# Patient Record
Sex: FEMALE | Race: WHITE | NOT HISPANIC OR LATINO
[De-identification: names, ages, dates, MRNs, and addresses within clinical notes are randomized per-mention and may not be internally consistent; named-entity substitution may affect disease eponyms.]

---

## 2019-05-24 ENCOUNTER — APPOINTMENT (OUTPATIENT)
Dept: POPULATION HEALTH | Facility: CLINIC | Age: 44
End: 2019-05-24
Payer: COMMERCIAL

## 2019-05-24 VITALS
OXYGEN SATURATION: 99 % | HEIGHT: 62 IN | SYSTOLIC BLOOD PRESSURE: 110 MMHG | BODY MASS INDEX: 25.76 KG/M2 | WEIGHT: 140 LBS | DIASTOLIC BLOOD PRESSURE: 70 MMHG | HEART RATE: 96 BPM | RESPIRATION RATE: 16 BRPM

## 2019-05-24 DIAGNOSIS — Z87.891 PERSONAL HISTORY OF NICOTINE DEPENDENCE: ICD-10-CM

## 2019-05-24 DIAGNOSIS — L23.81: ICD-10-CM

## 2019-05-24 DIAGNOSIS — J45.909 UNSPECIFIED ASTHMA, UNCOMPLICATED: ICD-10-CM

## 2019-05-24 DIAGNOSIS — Z82.5 FAMILY HISTORY OF ASTHMA AND OTHER CHRONIC LOWER RESPIRATORY DISEASES: ICD-10-CM

## 2019-05-24 PROBLEM — Z00.00 ENCOUNTER FOR PREVENTIVE HEALTH EXAMINATION: Status: ACTIVE | Noted: 2019-05-24

## 2019-05-24 PROCEDURE — 99203 OFFICE O/P NEW LOW 30 MIN: CPT

## 2019-05-24 RX ORDER — LORATADINE 5 MG/5 ML
10 SOLUTION, ORAL ORAL DAILY
Qty: 30 | Refills: 0 | Status: ACTIVE | COMMUNITY
Start: 2019-05-24 | End: 1900-01-01

## 2019-05-24 RX ORDER — ALBUTEROL 90 MCG
90 AEROSOL (GRAM) INHALATION
Refills: 0 | Status: ACTIVE | COMMUNITY

## 2019-05-24 RX ORDER — FLUTICASONE PROPIONATE 110 UG/1
110 AEROSOL, METERED RESPIRATORY (INHALATION) TWICE DAILY
Qty: 1 | Refills: 0 | Status: ACTIVE | COMMUNITY
Start: 2019-05-24 | End: 1900-01-01

## 2019-05-24 RX ORDER — FLUTICASONE PROPIONATE 50 UG/1
50 SPRAY, METERED NASAL TWICE DAILY
Qty: 1 | Refills: 0 | Status: ACTIVE | COMMUNITY
Start: 2019-05-24 | End: 1900-01-01

## 2019-05-24 NOTE — HISTORY OF PRESENT ILLNESS
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 Symptoms improve by the end of the week, and are better at home over the weekend.  Doesnt get rashes over vacations when not at work.\par mold testing was performed in school and not found to be above ambient levels.\par \par \par symptoms also worse when she goes to someones home that’s sarwat, or to a hotel room\par allergic to cats as well as some dogs; roaches\par \par \par PMHx seasonal allergies - sneezing, itchy eyes.  worse in spring and fall.\par \par Home:\par dog x 2 years.\par had a dog prior 8 years, and he passed 2 years ago.  no changes in symptoms between the pets\par carpet in childrens bedrooms, otherwise hardwood floors.\par heat - forced air. natural gas\par central air\par has air purifier in bedroom\par \par \par \par \par \par Symptoms include rash/hives on arms and face, worse around eyes, and shortness of breath/wheezing.\par

## 2019-05-24 NOTE — ASSESSMENT
[FreeTextEntry1] : 43 year old woman with enviromental allergies and asthma to dustmites exacerbated by exposure in the workplace\par \par asthma - uses albuterol twice a week - will add flovent to be used at times when allergies/wheezign are worse\par PND - flonase daily\par claritin OTC daily\par \par continue exposure avoidance\par  \par requesting documents/lab results from allergist before ordering bloodwork

## 2019-05-24 NOTE — PHYSICAL EXAM
[General Appearance - In No Acute Distress] : in no acute distress [General Appearance - Alert] : alert [Outer Ear] : the ears and nose were normal in appearance [PERRL With Normal Accommodation] : pupils were equal in size, round, and reactive to light [Sclera] : the sclera and conjunctiva were normal [Extraocular Movements] : extraocular movements were intact [Oropharynx] : the oropharynx was normal [Auscultation Breath Sounds / Voice Sounds] : lungs were clear to auscultation bilaterally [Heart Sounds Gallop] : no gallops [Heart Sounds] : normal S1 and S2 [Heart Rate And Rhythm] : heart rate was normal and rhythm regular [Heart Sounds Pericardial Friction Rub] : no pericardial rub [Murmurs] : no murmurs [Skin Color & Pigmentation] : normal skin color and pigmentation [] : no rash [Skin Turgor] : normal skin turgor

## 2019-06-07 ENCOUNTER — TRANSCRIPTION ENCOUNTER (OUTPATIENT)
Age: 44
End: 2019-06-07

## 2023-02-23 ENCOUNTER — NON-APPOINTMENT (OUTPATIENT)
Age: 48
End: 2023-02-23

## 2023-12-18 ENCOUNTER — NON-APPOINTMENT (OUTPATIENT)
Age: 48
End: 2023-12-18

## 2024-07-22 DIAGNOSIS — Z83.6 FAMILY HISTORY OF OTHER DISEASES OF THE RESPIRATORY SYSTEM: ICD-10-CM

## 2024-07-23 ENCOUNTER — NON-APPOINTMENT (OUTPATIENT)
Age: 49
End: 2024-07-23

## 2024-07-23 ENCOUNTER — APPOINTMENT (OUTPATIENT)
Dept: PEDIATRIC ALLERGY IMMUNOLOGY | Facility: CLINIC | Age: 49
End: 2024-07-23
Payer: COMMERCIAL

## 2024-07-23 VITALS
SYSTOLIC BLOOD PRESSURE: 125 MMHG | HEIGHT: 61 IN | TEMPERATURE: 98.1 F | WEIGHT: 123 LBS | HEART RATE: 84 BPM | OXYGEN SATURATION: 100 % | DIASTOLIC BLOOD PRESSURE: 83 MMHG | BODY MASS INDEX: 23.22 KG/M2

## 2024-07-23 DIAGNOSIS — J45.30 MILD PERSISTENT ASTHMA, UNCOMPLICATED: ICD-10-CM

## 2024-07-23 DIAGNOSIS — L23.81: ICD-10-CM

## 2024-07-23 DIAGNOSIS — J30.1 ALLERGIC RHINITIS DUE TO POLLEN: ICD-10-CM

## 2024-07-23 DIAGNOSIS — J30.89 OTHER ALLERGIC RHINITIS: ICD-10-CM

## 2024-07-23 PROCEDURE — 99204 OFFICE O/P NEW MOD 45 MIN: CPT | Mod: 25

## 2024-07-23 PROCEDURE — 94010 BREATHING CAPACITY TEST: CPT

## 2024-07-23 RX ORDER — BUDESONIDE AND FORMOTEROL FUMARATE DIHYDRATE 160; 4.5 UG/1; UG/1
160-4.5 AEROSOL RESPIRATORY (INHALATION) TWICE DAILY
Qty: 1 | Refills: 5 | Status: ACTIVE | COMMUNITY
Start: 2024-07-23 | End: 1900-01-01

## 2024-07-23 NOTE — SOCIAL HISTORY
Problem: RESPIRATORY - ADULT  Goal: Achieves optimal ventilation and oxygenation  INTERVENTIONS:  - Assess for changes in respiratory status  - Assess for changes in mentation and behavior  - Position to facilitate oxygenation and minimize respiratory effo [de-identified] : House with no carpet in the bedroom, no curtains, dust mite covers on bedding, central air conditioning with filters, forced air heating, no cigarette smoke exposure, 1 dog.

## 2024-07-23 NOTE — ASSESSMENT
[FreeTextEntry1] : Asthma mild persistent increased symptoms with exposure to dust and pollen: Trial of Symbicort 160/4.5, 2 puffs twice daily.  Spirometry was normal.  Strongly recommended to continue environmental control at home.  Note given for air conditioning and air purifier in the classroom.  Air conditioning will decrease exposure to outdoor pollens and keep area cool and circulating.  Air purifier will decrease exposure to dust.  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 Allergic rhinitis (dust mites, pollen): Environmental control as above.  Trial of Allegra 180 mg (2 samples given) and Nasacort AQ, 1 spray per nostril twice daily (2 samples given). History of urticaria and periocular dermatitis with exposure to dust: Decrease dust exposure.  Wear gloves when cleaning. Follow-up yearly if well.

## 2024-07-23 NOTE — HISTORY OF PRESENT ILLNESS
[de-identified] : In office for follow-up for allergies, asthma and recurrent urticaria.  Followed in the office from 2/2/2016.  Gets severe periocular swelling and rashes, stuffy nose, postnasal drip, watery eyes, shortness of breath and chest tightness, and urticaria in contact with dust.  Gets similar symptoms from exposure to pollen.  Skin testing during the first visit was strongly positive to dust mites and also positive to pollen from trees, grasses, ragweed, weeds, as well as cat, dog and feathers.  She tried sublingual immunotherapy with dust mites and she had hair loss, and more severe symptoms when she was exposed to dust.  She stopped the treatment.  She tries to control dust exposure at home by having hardwood floors, no curtains, allergy covers on bedding, wearing gloves when she cleans, air conditioning and air purifiers.  She had increased symptoms in the classroom.  Symptoms were somewhat controlled when she had air conditioning and she worked out of 1 room which she kept as clean as possible.  Next year, she will be in a small room with 1 window that is adjacent to a room with many books and excessive dust. 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 Before the school ended, patient moved her materials in the new room and she had an asthma attack while she was there.  She required nebulizer treatment upon return home.  Subsequently she noticed ongoing chest tightness and shortness of breath.  She also had increased stuffy nose, postnasal drip, watery eyes and itchy skin.  She received oral prednisone in the past for severe symptoms. She does not require frequent antibiotics for infections. She has now increased stuffy nose, postnasal drip and chest tightness since May.

## 2024-07-23 NOTE — IMPRESSION
[Spirometry] : Spirometry [Normal Spirometry] : spirometry normal [FreeTextEntry1] : Spirometry normal.

## 2024-07-23 NOTE — PHYSICAL EXAM
[Alert] : alert [No Acute Distress] : no acute distress [Supple] : the neck was supple [Normal S1, S2] : normal S1 and S2 [Regular Rhythm] : with a regular rhythm [Soft] : abdomen soft [Normal Cervical Lymph Nodes] : cervical [Skin Intact] : skin intact  [No Rash] : no rash [No clubbing] : no clubbing [No Cyanosis] : no cyanosis [Alert, Awake, Oriented as Age-Appropriate] : alert, awake, oriented as age appropriate [Urticaria] : no urticaria [de-identified] : Slightly raspy voice. [de-identified] : Mild bilateral conjunctival erythema. [de-identified] : Throat mild patchy redness.  Nasal mucosa pink, bilateral stuffy nose, scant eye discharge.  Tympanic membrane normal on the left and some wax on the right.  No sinus tenderness. [de-identified] : Chest clear, no wheezing or crackles.  Fair air entry.

## 2024-07-23 NOTE — REASON FOR VISIT
[Routine Follow-Up] : a routine follow-up visit for [Allergic Rhinitis] : allergic rhinitis [Asthma] : asthma [Hives] : hives

## 2024-08-20 ENCOUNTER — APPOINTMENT (OUTPATIENT)
Dept: UROLOGY | Facility: CLINIC | Age: 49
End: 2024-08-20
Payer: COMMERCIAL

## 2024-08-20 LAB
BILIRUB UR QL STRIP: NORMAL
CLARITY UR: CLEAR
COLLECTION METHOD: NORMAL
GLUCOSE UR-MCNC: NORMAL
HCG UR QL: 0.2 EU/DL
HGB UR QL STRIP.AUTO: ABNORMAL
KETONES UR-MCNC: NORMAL
LEUKOCYTE ESTERASE UR QL STRIP: NORMAL
NITRITE UR QL STRIP: NORMAL
PH UR STRIP: 5.5
PROT UR STRIP-MCNC: NORMAL
SP GR UR STRIP: 1.02

## 2024-08-20 PROCEDURE — 99203 OFFICE O/P NEW LOW 30 MIN: CPT

## 2024-08-20 PROCEDURE — 81003 URINALYSIS AUTO W/O SCOPE: CPT | Mod: QW

## 2024-08-26 NOTE — ASSESSMENT
[FreeTextEntry1] : Otside CT and u/a reviewed, ?passed stone will advise hydration and f/u with ultrasound

## 2024-08-26 NOTE — HISTORY OF PRESENT ILLNESS
[None] : None [FreeTextEntry1] : Pain in left side of back, seen at hospital [Hematuria - Gross] : no gross hematuria [Fever] : no fever [Nausea] : no nausea

## 2024-08-28 ENCOUNTER — APPOINTMENT (OUTPATIENT)
Dept: UROLOGY | Facility: CLINIC | Age: 49
End: 2024-08-28
Payer: COMMERCIAL

## 2024-08-28 VITALS
SYSTOLIC BLOOD PRESSURE: 149 MMHG | WEIGHT: 123 LBS | HEART RATE: 99 BPM | HEIGHT: 61 IN | DIASTOLIC BLOOD PRESSURE: 82 MMHG | OXYGEN SATURATION: 99 % | BODY MASS INDEX: 23.22 KG/M2

## 2024-08-28 DIAGNOSIS — R10.9 UNSPECIFIED ABDOMINAL PAIN: ICD-10-CM

## 2024-08-28 PROCEDURE — 99214 OFFICE O/P EST MOD 30 MIN: CPT

## 2024-08-28 NOTE — ASSESSMENT
[FreeTextEntry1] : Urine studies are ordered. Patient's CAT scan CD will be sent to radiology for images to further assess if patient has any renal stones.

## 2024-08-28 NOTE — HISTORY OF PRESENT ILLNESS
[FreeTextEntry1] : This 48 year old female presents with a complaint of left flank pain. She was seen in the hospital for left flank pain and had a CAT scan and report reads that patient has no ureteric stones. Patient then had a renal bladder sonogram that showed bilateral non obstructing stones. Patient reports she is currently asymptomatic and would like to know if in fact she has renal stones. She brought in a disc with her from the hospital but is not compatible with the computer here.

## 2024-08-29 LAB
APPEARANCE: CLEAR
BACTERIA: NEGATIVE /HPF
BILIRUBIN URINE: NEGATIVE
BLOOD URINE: NEGATIVE
CAST: 1 /LPF
COLOR: YELLOW
EPITHELIAL CELLS: 9 /HPF
GLUCOSE QUALITATIVE U: NEGATIVE MG/DL
KETONES URINE: NEGATIVE MG/DL
LEUKOCYTE ESTERASE URINE: NEGATIVE
MICROSCOPIC-UA: NORMAL
NITRITE URINE: NEGATIVE
PH URINE: 6.5
PROTEIN URINE: NEGATIVE MG/DL
RED BLOOD CELLS URINE: 0 /HPF
SPECIFIC GRAVITY URINE: 1.01
UROBILINOGEN URINE: 0.2 MG/DL
WHITE BLOOD CELLS URINE: 2 /HPF

## 2024-08-31 LAB — BACTERIA UR CULT: NORMAL

## 2024-09-03 ENCOUNTER — APPOINTMENT (OUTPATIENT)
Dept: UROLOGY | Facility: CLINIC | Age: 49
End: 2024-09-03

## 2024-09-06 LAB — URINE CYTOLOGY: NORMAL

## 2024-09-09 ENCOUNTER — NON-APPOINTMENT (OUTPATIENT)
Age: 49
End: 2024-09-09